# Patient Record
Sex: MALE | URBAN - METROPOLITAN AREA
[De-identification: names, ages, dates, MRNs, and addresses within clinical notes are randomized per-mention and may not be internally consistent; named-entity substitution may affect disease eponyms.]

---

## 2017-08-16 ENCOUNTER — OFFICE (OUTPATIENT)
Dept: URBAN - METROPOLITAN AREA CLINIC 11 | Facility: CLINIC | Age: 62
End: 2017-08-16

## 2017-08-16 VITALS
HEIGHT: 70 IN | HEART RATE: 85 BPM | DIASTOLIC BLOOD PRESSURE: 88 MMHG | WEIGHT: 203 LBS | SYSTOLIC BLOOD PRESSURE: 148 MMHG

## 2017-08-16 DIAGNOSIS — I10 ESSENTIAL (PRIMARY) HYPERTENSION: ICD-10-CM

## 2017-08-16 DIAGNOSIS — Z12.11 ENCOUNTER FOR SCREENING FOR MALIGNANT NEOPLASM OF COLON: ICD-10-CM

## 2017-08-16 DIAGNOSIS — E66.3 OVERWEIGHT: ICD-10-CM

## 2017-08-16 DIAGNOSIS — Z01.818 ENCOUNTER FOR OTHER PREPROCEDURAL EXAMINATION: ICD-10-CM

## 2017-08-16 PROCEDURE — 99202 OFFICE O/P NEW SF 15 MIN: CPT | Performed by: INTERNAL MEDICINE

## 2017-08-16 RX ORDER — SODIUM PICOSULFATE, MAGNESIUM OXIDE, AND ANHYDROUS CITRIC ACID 10; 3.5; 12 MG/16.1G; G/16.1G; G/16.1G
POWDER, METERED ORAL
Qty: 1 | Refills: 0 | Status: ACTIVE
Start: 2017-08-16

## 2017-08-16 NOTE — SERVICENOTES
We will go ahead and schedule average risk colonoscopy but given that he apparently has had a change in his EKG and is already getting set up to see Cardiology both he and I agree that it is reasonable that he complete his new cardiology evaluation and we will obtain clearance prior to having him undergo the colonoscopy.

## 2017-08-16 NOTE — SERVICEHPINOTES
Mr. Escobar is a 62-year-old man here for evaluation for colonoscopy for screening. The patient states that his last colonoscopy was around 2003 and was normal with no polyps. He denies any problems with abdominal pain, nausea, vomiting, reflux, dysphagia, diarrhea, constipation, or rectal bleeding. There is no first-degree family history of colon cancer or colon polyps. He is average risk for screening colonoscopy. The patient is here because he apparently had an abnormal EKG recently. According to the medical records provided and the patient is history he states that he had some chest pain last year for which he underwent a full cardiac evaluation including stress test which was negative. According to the medical records he had a repeat EKG recently which has changed since then and so because of this has been referred to Cardiology, though the patient states he has not been seen yet. He denies any chest pain or shortness of breath whatsoever. He denies any dyspnea on exertion.